# Patient Record
Sex: FEMALE | Race: WHITE
[De-identification: names, ages, dates, MRNs, and addresses within clinical notes are randomized per-mention and may not be internally consistent; named-entity substitution may affect disease eponyms.]

---

## 2017-01-15 ENCOUNTER — HOSPITAL ENCOUNTER (EMERGENCY)
Dept: HOSPITAL 25 - UCEAST | Age: 57
Discharge: HOME | End: 2017-01-15
Payer: COMMERCIAL

## 2017-01-15 VITALS — SYSTOLIC BLOOD PRESSURE: 132 MMHG | DIASTOLIC BLOOD PRESSURE: 67 MMHG

## 2017-01-15 DIAGNOSIS — J40: Primary | ICD-10-CM

## 2017-01-15 PROCEDURE — 87798 DETECT AGENT NOS DNA AMP: CPT

## 2017-01-15 PROCEDURE — 99212 OFFICE O/P EST SF 10 MIN: CPT

## 2017-01-15 PROCEDURE — G0463 HOSPITAL OUTPT CLINIC VISIT: HCPCS

## 2017-01-15 PROCEDURE — 71020: CPT

## 2017-01-15 NOTE — RAD
INDICATION: Cough     



COMPARISON: June 22, 2016

 

TECHNIQUE: PA and lateral dual-energy views were obtained.



FINDINGS: 



Bones/Soft Tissues: There are no acute bony findings.    



Cardiomediastinal: The cardiomediastinal silhouette is normal. 



Lungs: There are no infiltrates.



Pleura: There are no pleural effusions. 



Other: None



IMPRESSION:  NO ACTIVE DISEASE

## 2017-01-15 NOTE — UC
Respiratory Complaint HPI





- HPI Summary


HPI Summary: 





Started with dry cough about 3 weeks ago. Cough continued and gradually worsened

, and this week getting more productive cough, SOB with exertion, and occ 

coughing fits that last several minutes. Denies fever or post-tussive vomiting. 

No nasal congestion or ST at beginning. Is waking up with nighttime symptoms.





- History of Current Complaint


Chief Complaint: UCGeneralIllness


Stated Complaint: URI


Time Seen by Provider: 01/15/17 12:55


Hx Obtained From: Patient


Pregnant?: No


Onset/Duration: Gradual Onset, Lasting Weeks


Timing: Constant


Severity Initially: Mild


Severity Currently: Moderate





- Allergies/Home Medications


Allergies/Adverse Reactions: 


 Allergies











Allergy/AdvReac Type Severity Reaction Status Date / Time


 


No Known Allergies Allergy   Verified 06/22/16 14:00














PMH/Surg Hx/FS Hx/Imm Hx


Previously Healthy: Yes


Endocrine History Of: 


   Denies: Diabetes, Thyroid Disease


Cardiovascular History Of: 


   Denies: Cardiac Disorders, Hypertension


Respiratory History Of: 


   Denies: COPD, Asthma


GI/ History Of: 


   Denies: Ulcer


Cancer History Of: 


   Denies: Breast Cancer





- Surgical History


Surgical History: Yes


Surgery Procedure, Year, and Place: jaw surgery.  bilat breast bx





- Family History


Known Family History: Positive: Hypertension - DAD


Family History: MOM - ARRHYTHMIA S/P PACEMAKER, DAD - CANCER





- Social History


Lives: With Family


Alcohol Use: Occasionally


Substance Use Type: None


Smoking Status (MU): Never Smoked Tobacco





Review of Systems


Constitutional: Negative


Skin: Negative


Eyes: Negative


ENT: Negative


Respiratory: Shortness Of Breath, Cough


Cardiovascular: Negative


Gastrointestinal: Negative


Genitourinary: Negative


Motor: Negative


Neurovascular: Negative


Musculoskeletal: Negative


Neurological: Negative


Psychological: Negative


All Other Systems Reviewed And Are Negative: Yes





Physical Exam


Triage Information Reviewed: Yes


Appearance: Ill-Appearing - constant coughing with difficulty speaking through 

exam


Vital Signs: 


 Initial Vital Signs











Temp  97.8 F   01/15/17 12:50


 


Pulse  73   01/15/17 12:50


 


Resp  16   01/15/17 12:50


 


BP  132/67   01/15/17 12:50


 


Pulse Ox  99   01/15/17 12:50











Vital Signs Reviewed: Yes


Eye Exam: Normal


Eyes: Positive: Conjunctiva Clear


ENT: Positive: Normal ENT inspection, Hearing grossly normal, Pharynx normal, 

TMs normal.  Negative: Nasal congestion


Dental Exam: Normal


Neck exam: Normal


Neck: Positive: Supple, Nontender, No Lymphadenopathy


Respiratory Exam: Other - constant dry cough with gagging


Respiratory: Positive: Chest non-tender, Lungs clear, Normal breath sounds


Cardiovascular Exam: Normal


Cardiovascular: Positive: RRR, No Murmur


Musculoskeletal Exam: Normal


Neurological Exam: Normal


Psychological Exam: Normal


Skin Exam: Normal





UC Diagnostic Evaluation





- Laboratory


O2 Sat by Pulse Oximetry: 99





Respiratory Course/Dx





- Differential Dx/Diagnosis


Provider Diagnoses: bronchitis





Discharge





- Discharge Plan


Condition: Stable


Disposition: HOME


Prescriptions: 


Acetaminop/Codeine 30 MG TAB* [Tylenol/Codeine 30 MG TAB*] 1 - 2 tab PO BEDTIME 

PRN #15 tab MDD 2


 PRN Reason: Cough


Azithromycin TAB* [Zithromax TAB*] 250 mg PO SEE INSTRUCTIONS #6 tab


Benzonatate CAP* [Tessalon CAP*] 100 mg PO TID PRN #30 cap


 PRN Reason: Cough


Ipratropium HFA INHALER* [Atrovent Hfa Inhaler*] 2 puff INH TID #1 mdi


Patient Education Materials:  Acute Bronchitis (ED)


Referrals: 


Rubén Seals MD [Primary Care Provider] - 4 Days


Additional Instructions: 


You are being tested and treated for pertussis (whooping cough) today. Please 

see your primary care provider this week for a recheck.

## 2019-01-02 ENCOUNTER — HOSPITAL ENCOUNTER (EMERGENCY)
Dept: HOSPITAL 25 - UCEAST | Age: 59
Discharge: HOME | End: 2019-01-02
Payer: COMMERCIAL

## 2019-01-02 VITALS — SYSTOLIC BLOOD PRESSURE: 140 MMHG | DIASTOLIC BLOOD PRESSURE: 71 MMHG

## 2019-01-02 DIAGNOSIS — J01.90: Primary | ICD-10-CM

## 2019-01-02 PROCEDURE — 99212 OFFICE O/P EST SF 10 MIN: CPT

## 2019-01-02 PROCEDURE — G0463 HOSPITAL OUTPT CLINIC VISIT: HCPCS

## 2019-03-08 ENCOUNTER — HOSPITAL ENCOUNTER (EMERGENCY)
Dept: HOSPITAL 25 - ED | Age: 59
Discharge: HOME | End: 2019-03-08
Payer: COMMERCIAL

## 2019-03-08 VITALS — DIASTOLIC BLOOD PRESSURE: 56 MMHG | SYSTOLIC BLOOD PRESSURE: 104 MMHG

## 2019-03-08 DIAGNOSIS — R50.9: ICD-10-CM

## 2019-03-08 DIAGNOSIS — R10.9: Primary | ICD-10-CM

## 2019-03-08 DIAGNOSIS — K44.9: ICD-10-CM

## 2019-03-08 DIAGNOSIS — N28.9: ICD-10-CM

## 2019-03-08 DIAGNOSIS — R11.2: ICD-10-CM

## 2019-03-08 DIAGNOSIS — R19.7: ICD-10-CM

## 2019-03-08 LAB
ALBUMIN SERPL BCG-MCNC: 4.3 G/DL (ref 3.2–5.2)
ALBUMIN/GLOB SERPL: 1.7 {RATIO} (ref 1–3)
ALP SERPL-CCNC: 53 U/L (ref 34–104)
ALT SERPL W P-5'-P-CCNC: 15 U/L (ref 7–52)
AMYLASE SERPL-CCNC: 28 U/L (ref 29–103)
ANION GAP SERPL CALC-SCNC: 8 MMOL/L (ref 2–11)
AST SERPL-CCNC: 17 U/L (ref 13–39)
BASOPHILS # BLD AUTO: 0 10^3/UL (ref 0–0.2)
BUN SERPL-MCNC: 18 MG/DL (ref 6–24)
BUN/CREAT SERPL: 23.1 (ref 8–20)
CALCIUM SERPL-MCNC: 9.1 MG/DL (ref 8.6–10.3)
CHLORIDE SERPL-SCNC: 106 MMOL/L (ref 101–111)
EOSINOPHIL # BLD AUTO: 0 10^3/UL (ref 0–0.6)
GLOBULIN SER CALC-MCNC: 2.5 G/DL (ref 2–4)
GLUCOSE SERPL-MCNC: 138 MG/DL (ref 70–100)
HCO3 SERPL-SCNC: 26 MMOL/L (ref 22–32)
HCT VFR BLD AUTO: 42 % (ref 35–47)
HGB BLD-MCNC: 14.2 G/DL (ref 12–16)
LYMPHOCYTES # BLD AUTO: 0.3 10^3/UL (ref 1–4.8)
MAGNESIUM SERPL-MCNC: 1.7 MG/DL (ref 1.9–2.7)
MCH RBC QN AUTO: 31 PG (ref 27–31)
MCHC RBC AUTO-ENTMCNC: 34 G/DL (ref 31–36)
MCV RBC AUTO: 93 FL (ref 80–97)
MONOCYTES # BLD AUTO: 0.4 10^3/UL (ref 0–0.8)
NEUTROPHILS # BLD AUTO: 12.4 10^3/UL (ref 1.5–7.7)
NRBC # BLD AUTO: 0 10^3/UL
NRBC BLD QL AUTO: 0
PLATELET # BLD AUTO: 168 10^3/UL (ref 150–450)
POTASSIUM SERPL-SCNC: 4.1 MMOL/L (ref 3.5–5)
PROT SERPL-MCNC: 6.8 G/DL (ref 6.4–8.9)
RBC # BLD AUTO: 4.52 10^6/UL (ref 4–5.4)
SODIUM SERPL-SCNC: 140 MMOL/L (ref 135–145)
WBC # BLD AUTO: 13.2 10^3/UL (ref 3.5–10.8)

## 2019-03-08 PROCEDURE — 86140 C-REACTIVE PROTEIN: CPT

## 2019-03-08 PROCEDURE — 85025 COMPLETE CBC W/AUTO DIFF WBC: CPT

## 2019-03-08 PROCEDURE — 99284 EMERGENCY DEPT VISIT MOD MDM: CPT

## 2019-03-08 PROCEDURE — 96374 THER/PROPH/DIAG INJ IV PUSH: CPT

## 2019-03-08 PROCEDURE — 74177 CT ABD & PELVIS W/CONTRAST: CPT

## 2019-03-08 PROCEDURE — 36415 COLL VENOUS BLD VENIPUNCTURE: CPT

## 2019-03-08 PROCEDURE — 96375 TX/PRO/DX INJ NEW DRUG ADDON: CPT

## 2019-03-08 PROCEDURE — 80053 COMPREHEN METABOLIC PANEL: CPT

## 2019-03-08 PROCEDURE — 83690 ASSAY OF LIPASE: CPT

## 2019-03-08 PROCEDURE — 81003 URINALYSIS AUTO W/O SCOPE: CPT

## 2019-03-08 PROCEDURE — 96361 HYDRATE IV INFUSION ADD-ON: CPT

## 2019-03-08 PROCEDURE — 83735 ASSAY OF MAGNESIUM: CPT

## 2019-03-08 PROCEDURE — 82150 ASSAY OF AMYLASE: CPT

## 2019-03-08 NOTE — ED
GI/ HPI





- HPI Summary


HPI Summary: 





This pt is a 59 [facility] [facility], vomiting, and 

diarrhea since 22:00 last night, 3/7/19. She states she has had multiple 

episodes of emesis. Pt reports she has had about 4 episodes of diarrhea so far. 

She presents with a low grade fever of 100 F today. Additionally notes 

intermittent abd cramping. Pt states her  and her ate the same thing 

during dinner last night. She notes she has not vomited like this in such a 

long time. 


Denies hx abdominal surgeries. 








- History of Current Complaint


Chief Complaint: EDNauseaVomitDiarrh


Time Seen by Provider: 03/08/19 04:06


Stated Complaint: VOMITING,DIFF BREATHING-PER PT


Hx Obtained From: Patient


Onset/Duration: Started Hours Ago, Still Present


Timing: Lasting Hours


Current Severity: Moderate


Pain Intensity: 0 - denies any pain


Associated Signs and Symptoms: Positive: Nausea, Vomiting, Diarrhea, Fever, 

Abdominal Pain


Aggravating Factor(s): Nothing


Alleviating Factor(s): Nothing





- Allergy/Home Medications


Allergies/Adverse Reactions: 


 Allergies











Allergy/AdvReac Type Severity Reaction Status Date / Time


 


borax Allergy  Hives Uncoded 01/02/19 07:59














PMH/Surg Hx/FS Hx/Imm Hx


Endocrine/Hematology History: 


   Denies: Hx Diabetes, Hx Thyroid Disease


Cardiovascular History: 


   Denies: Hx Hypertension


   Comment Only: Other Cardiovascular Problems/Disorders - MYTROVALVE PROLAPSE


Respiratory History: 


   Denies: Hx Asthma, Hx Chronic Obstructive Pulmonary Disease (COPD)


GI History: 


   Denies: Hx Ulcer





- Cancer History


Hx Chemotherapy: No


Hx Radiation Therapy: No





- Surgical History


Surgery Procedure, Year, and Place: jaw surgery.  bilat breast bx


Infectious Disease History: No


Infectious Disease History: Reports: Hx Shingles


   Denies: Hx Hepatitis, Hx Human Immunodeficiency Virus (HIV), History Other 

Infectious Disease, Traveled Outside the [place] in Last 30 Days





- Family History


Known Family History: Positive: Hypertension - DAD


Family History: MOM - ARRHYTHMIA S/P PACEMAKER, DAD - CANCER





- Social History


Alcohol Use: Occasionally


Hx Substance Use: No


Substance Use Type: Reports: None


Hx Tobacco Use: No


Smoking Status (MU): Never Smoked Tobacco





Review of Systems


Positive: Fever - low grade


Positive: Abdominal Pain, Vomiting, Diarrhea, Nausea


All Other Systems Reviewed And Are Negative: Yes





Physical Exam





- Summary


Physical Exam Summary: 





VITAL SIGNS: Reviewed.


GENERAL: Patient is a well-developed and nourished female who is lying 

comfortable in the stretcher. Patient is not in any acute respiratory distress.


HEAD AND FACE: No signs of trauma. No ecchymosis, hematomas or skull 

depressions. No sinus tenderness.


EYES: PERRLA, EOMI x 2, No injected conjunctiva, no nystagmus.


EARS: Hearing grossly intact. Ear canals and tympanic membranes are within 

normal limits.


MOUTH: Oropharynx within normal limits.


NECK: Supple, trachea is midline, no adenopathy, no JVD, no carotid bruit, no c-

spine tenderness, neck with full ROM.


CHEST: Symmetric, no tenderness at palpation


LUNGS: Clear to auscultation bilaterally. No wheezing or crackles.


CVS: Regular rate and rhythm, S1 and S2 present, no murmurs or gallops 

appreciated.


ABDOMEN: Soft, non-tender. No signs of distention. No rebound no guarding, and 

no masses palpated. Hyperactive bowel sounds.


EXTREMITIES: FROM in all major joints, no edema, no cyanosis or clubbing.


NEURO: Alert and oriented x 3. No acute neurological deficits. Speech is normal 

and follows commands.


SKIN: Dry and warm


Triage Information Reviewed: Yes


Vital Signs On Initial Exam: 


 Initial Vitals











Temp Pulse Resp BP Pulse Ox


 


 100 F   80   18   134/86   100 


 


 03/08/19 04:00  03/08/19 04:00  03/08/19 04:00  03/08/19 04:00  03/08/19 04:00











Vital Signs Reviewed: Yes





Diagnostics





- Vital Signs


 Vital Signs











  Temp Pulse Resp BP Pulse Ox


 


 03/08/19 04:02   81   134/86  99


 


 03/08/19 04:00  100 F  77  18  134/86  100














- Laboratory


Result Diagrams: 


 03/08/19 04:22





 03/08/19 04:22


Lab Statement: Any lab studies that have been ordered have been reviewed, and 

results considered in the medical decision making process.





GIGU Course/Dx





- Course


Assessment/Plan: Pt is a 59 y/o female who presents with nausea, vomiting, and 

diarrhea since 22:00 last night, 3/7/19. Pt reports she has had about 4 

episodes of diarrhea so far. She presents with a low grade fever of 100 F 

today. Additionally notes intermittent abd cramping. Pt states her  and 

her ate the same thing during dinner last night. She notes she has not vomited 

in such a long time.  Labs remarkable for WBC of 13.2.  In the ED course the pt 

was given IV fluids, Reglan, Zofran, Lomotil, Tylenol.  CT abdomen/pelvis was 

ordered.  Pt will be signed out to Dr. French, pending disposition, awaiting CT.





- Diagnoses


Provider Diagnoses: 


 Abdominal pain








Discharge





- Sign-Out/Discharge


Documenting (check all that apply): Sign-Out Patient


Signing out patient TO: Jabier French - pending CT and dispo


Patient Received Moderate/Deep Sedation with Procedure: No





- Discharge Plan


Condition: Stable


Referrals: 


Elvie Jolly MD [Primary Care Provider] - 





- Attestation Statements


Document Initiated by Scribe: Yes


Documenting Scribe: Ayana Sands


Provider For Whom Scribe is Documenting (Include Credential): Willy Cox MD


Scribe Attestation: 


IAyana, scribed for Willy Cox MD on 03/08/19 at 0656. 


Status of Scribe Document: Ready

## 2019-03-08 NOTE — ED
Progress





- Progress Note


Progress Note: 


Receiving sign out from Dr. Cox, pending CT A/P.





CT A/P: 1. Left renal lesion, probably cyst. 


2. Retroverted uterus. 


3. Small hiatus hernia. 


ED physician reviewed radiology report.





Pt's condition has been stable. Pt will be discharged home with a final dx of 

gastroenteritis. She is agreeable with this plan.





Re-Evaluation





- Re-Evaluation


  ** First Eval


Re-Evaluation Time: 07:40


Change: Improved


Comment: Pt feels much better.





Course/Dx





- Diagnoses


Provider Diagnoses: 


 Abdominal pain








Discharge





- Sign-Out/Discharge


Documenting (check all that apply): Patient Departure - Discharge, Receiving 

Sign-Out


Receiving patient FROM: Willy Cox


Patient Received Moderate/Deep Sedation with Procedure: No





- Discharge Plan


Condition: Stable


Disposition: HOME


Prescriptions: 


Ondansetron ODT TAB* [Zofran Odt TAB*] 4 mg PO Q6H PRN #20 tab.odt


 PRN Reason: Nausea/Vomiting


Patient Education Materials:  Gastroenteritis (ED)


Referrals: 


Elvie Jolly MD [Primary Care Provider] - 





- Billing Disposition and Condition


Condition: STABLE


Disposition: Home





- Attestation Statements


Document Initiated by Scribe: Yes


Documenting Scribe: Stephanie Mehta


Provider For Whom Scribe is Documenting (Include Credential): Jabier French MD


Scribe Attestation: 


Stephanie HERRMANN, scrsusaned for Jbaier French MD on 03/08/19 at 0813. 


Scribe Documentation Reviewed: Yes


Provider Attestation: 


The documentation as recorded by the Stephanie lorenzo accurately reflects the 

service I personally performed and the decisions made by me, Jabier French MD


Status of Scribe Document: Viewed

## 2019-09-16 ENCOUNTER — HOSPITAL ENCOUNTER (EMERGENCY)
Dept: HOSPITAL 25 - UCEAST | Age: 59
Discharge: HOME | End: 2019-09-16
Payer: COMMERCIAL

## 2019-09-16 VITALS — SYSTOLIC BLOOD PRESSURE: 126 MMHG | DIASTOLIC BLOOD PRESSURE: 79 MMHG

## 2019-09-16 DIAGNOSIS — T63.461A: Primary | ICD-10-CM

## 2019-09-16 DIAGNOSIS — L03.114: ICD-10-CM
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
## 2019-09-16 PROCEDURE — G0463 HOSPITAL OUTPT CLINIC VISIT: HCPCS

## 2019-09-16 PROCEDURE — 99212 OFFICE O/P EST SF 10 MIN: CPT

## 2019-09-16 NOTE — UC
Skin Complaint HPI





- HPI Summary


HPI Summary: 





The patient is a 57 yo female that was stung on her left elbow about 10 days 

ago by a yellow jacket





It has been itchy every since





she now has redness and swelling in the area





no f/c


some mild malaise











- History of Current Complaint


Chief Complaint: UCSkin


Time Seen by Provider: 09/16/19 17:53


Stated Complaint: SKIN COMPLAINT


Hx Obtained From: Patient


Onset/Duration: Sudden Onset, Lasting Days


Skin Exposure Onset/Duration: Hours Ago


Onset Severity: Moderate


Current Severity: Moderate


Pain Intensity: 5


Pain Scale Used: 0-10 Numeric


Location: Discrete


Character: Swelling, Pruritus, Pain


Aggravating Factor(s): Touch


Alleviating Factor(s): Nothing


Associated Signs & Symptoms: Positive: Tenderness.  Negative: Nausea, Vomiting, 

Numbness, Thirst, Diaphoresis, Weakness, Pallor, Shivering, Difficulty Breathing

, Fever, Chills, Cough, Wheezing, Chest Pain, Hoarseness, Throat Tightening, 

Abdominal Pain, Lightheadedness, Syncope, Drainage, Bruising, Red Streaks, 

Joint Swelling


Related History: Insect Bite/Sting





- Allergy/Home Medications


Allergies/Adverse Reactions: 


 Allergies











Allergy/AdvReac Type Severity Reaction Status Date / Time


 


borax Allergy  Hives Uncoded 09/16/19 17:55











Home Medications: 


 Home Medications





Cbd Oil  09/16/19 [History]


Multivitamin [Multivitamins] 1 cap PO DAILY 09/16/19 [History Confirmed 09/16/19

]











PMH/Surg Hx/FS Hx/Imm Hx


Previously Healthy: Yes





- Surgical History


Surgical History: Yes


Surgery Procedure, Year, and Place: jaw surgery.  bilat breast bx





- Family History


Known Family History: Positive: Hypertension - DAD, Non-Contributory


Family History: MOM - ARRHYTHMIA S/P PACEMAKER, DAD - CANCER





- Social History


Alcohol Use: Occasionally


Substance Use Type: None


Smoking Status (MU): Never Smoked Tobacco





Review of Systems


All Other Systems Reviewed And Are Negative: Yes


Constitutional: Positive: Negative


Skin: Positive: Negative


Eyes: Positive: Negative


ENT: Positive: Negative


Respiratory: Positive: Negative


Cardiovascular: Positive: Negative


Gastrointestinal: Positive: Negative


Genitourinary: Positive: Negative


Motor: Positive: Negative


Neurovascular: Positive: Negative


Musculoskeletal: Positive: Negative


Neurological: Positive: Negative


Psychological: Positive: Negative





Physical Exam


Triage Information Reviewed: Yes


Appearance: Well-Appearing, No Pain Distress, Well-Nourished


Vital Signs: 


 Initial Vital Signs











Temp  98.8 F   09/16/19 17:56


 


Pulse  61   09/16/19 17:56


 


Resp  18   09/16/19 17:56


 


BP  126/79   09/16/19 17:56


 


Pulse Ox  99   09/16/19 17:56











Vital Signs Reviewed: Yes


Eyes: Positive: Conjunctiva Clear


ENT: Positive: Hearing grossly normal.  Negative: Nasal congestion, Nasal 

drainage, Trismus, Muffled voice, Hoarse voice


Neck: Positive: Supple, Nontender, No Lymphadenopathy


Respiratory: Positive: Lungs clear, Normal breath sounds, No respiratory 

distress


Cardiovascular: Positive: RRR, No Murmur, Pulses Normal


Musculoskeletal: Positive: ROM Intact, No Edema


Neurological: Positive: Alert


Psychological: Positive: Normal Response To Family


Skin Exam: Other - see image





Images


Front/Back of Body, Lg (Mono): 


  __________________________














  __________________________





 1 - excoriated sting site





 2 - surrounding erthyema/swelling








Course/Dx





- Diagnoses


Provider Diagnosis: 


 Sting, wasp, Left arm cellulitis








Discharge ED





- Sign-Out/Discharge


Documenting (check all that apply): Patient Departure


All imaging exams completed and their final reports reviewed: No Studies





- Discharge Plan


Condition: Stable


Disposition: HOME


Prescriptions: 


Cephalexin CAP* [Keflex CAP*] 500 mg PO QID #28 cap


predniSONE [Deltasone 20 MG TAB] 40 mg PO DAILY #10 tab


Patient Education Materials:  Cellulitis (ED), Insect Bite or Sting (ED)


Referrals: 


Elvie Jolly MD [Primary Care Provider] - 4 Days (if not better)





- Billing Disposition and Condition


Condition: STABLE


Disposition: Home